# Patient Record
Sex: MALE | URBAN - NONMETROPOLITAN AREA
[De-identification: names, ages, dates, MRNs, and addresses within clinical notes are randomized per-mention and may not be internally consistent; named-entity substitution may affect disease eponyms.]

---

## 2021-12-13 ENCOUNTER — OFFICE VISIT (OUTPATIENT)
Dept: URBAN - NONMETROPOLITAN AREA CLINIC 7 | Facility: CLINIC | Age: 86
End: 2021-12-13
Payer: COMMERCIAL

## 2021-12-13 DIAGNOSIS — H25.813 COMBINED FORMS OF AGE-RELATED CATARACT, BILATERAL: Primary | ICD-10-CM

## 2021-12-13 DIAGNOSIS — H35.3211 EXUDATIVE MACULAR DEGENERATION, WITH ACTIVE CHOROIDAL NEOVASCULARIZATION, RIGHT EYE: ICD-10-CM

## 2021-12-13 DIAGNOSIS — H04.123 TEAR FILM INSUFFICIENCY OF BILATERAL LACRIMAL GLANDS: ICD-10-CM

## 2021-12-13 DIAGNOSIS — H43.813 BILATERAL VITREOUS DETACHMENT OF EYES: ICD-10-CM

## 2021-12-13 DIAGNOSIS — H52.4 PRESBYOPIA: ICD-10-CM

## 2021-12-13 DIAGNOSIS — H11.153 PINGUECULA, BILATERAL: ICD-10-CM

## 2021-12-13 DIAGNOSIS — H40.053 OCULAR HYPERTENSION, BILATERAL: ICD-10-CM

## 2021-12-13 PROCEDURE — 76514 ECHO EXAM OF EYE THICKNESS: CPT | Performed by: OPTOMETRIST

## 2021-12-13 PROCEDURE — 99204 OFFICE O/P NEW MOD 45 MIN: CPT | Performed by: OPTOMETRIST

## 2021-12-13 PROCEDURE — 92134 CPTRZ OPH DX IMG PST SGM RTA: CPT | Performed by: OPTOMETRIST

## 2021-12-13 ASSESSMENT — KERATOMETRY
OS: 44.54
OD: 44.72

## 2021-12-13 ASSESSMENT — INTRAOCULAR PRESSURE
OS: 24
OD: 22

## 2021-12-13 ASSESSMENT — VISUAL ACUITY
OS: 20/40
OD: 20/60

## 2021-12-13 NOTE — IMPRESSION/PLAN
Impression: Nonexudative macular degeneration, early dry stage, left eye: H35.3121. Plan: I recommended use of AREDS-based vitamins daily. Patient education provided. The patient was counseled to contact office if changes to vision occur. I will monitor for future change. OCT ordered for future comparison for signs of progression.

## 2021-12-13 NOTE — IMPRESSION/PLAN
Impression: Ocular hypertension, bilateral: H40.053. Plan: IOP's are elevated OU. The patient will return for a baseline Roman visual field, optical coherence tomography, and an intra-ocular pressure check. Patient education provided.

## 2021-12-13 NOTE — IMPRESSION/PLAN
Impression: Bilateral vitreous detachment of eyes: H43.813. Plan: Education provided regarding symptoms and risks of retinal holes, tears, and detachment. The patient was instructed to contact office immediately should symptoms occur. Continue to monitor.

## 2021-12-13 NOTE — IMPRESSION/PLAN
Impression: Nonexudative macular degeneration, intermediate dry stage, bilateral: H35.3132. Plan: I recommended use of AREDS-based vitamins daily. Patient education provided. The patient was counseled to contact office if changes to vision occur. I will monitor for future change. OCT ordered for future comparison for signs of progression.

## 2021-12-13 NOTE — IMPRESSION/PLAN
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 Plan: Refer to retina. Patient education. OCT ordered and interpreted.

## 2021-12-13 NOTE — IMPRESSION/PLAN
Impression: Combined forms of age-related cataract, bilateral: H25.813. Plan: The cataract(s) account for patient's vision. The options of surgery or glasses change were discussed. Surgery risks and benefits were explained. Patient elects to defer surgery at this time. Continue to monitor.

## 2022-03-21 ENCOUNTER — TESTING ONLY (OUTPATIENT)
Dept: URBAN - NONMETROPOLITAN AREA CLINIC 7 | Facility: CLINIC | Age: 87
End: 2022-03-21
Payer: COMMERCIAL

## 2022-03-21 PROCEDURE — 92133 CPTRZD OPH DX IMG PST SGM ON: CPT | Performed by: OPTOMETRIST

## 2022-03-21 PROCEDURE — 92083 EXTENDED VISUAL FIELD XM: CPT | Performed by: OPTOMETRIST

## 2022-03-22 ENCOUNTER — OFFICE VISIT (OUTPATIENT)
Dept: URBAN - NONMETROPOLITAN AREA CLINIC 7 | Facility: CLINIC | Age: 87
End: 2022-03-22
Payer: COMMERCIAL

## 2022-03-22 DIAGNOSIS — H35.3121 NONEXUDATIVE MACULAR DEGENERATION, EARLY DRY STAGE, LEFT EYE: ICD-10-CM

## 2022-03-22 PROCEDURE — 99213 OFFICE O/P EST LOW 20 MIN: CPT | Performed by: OPTOMETRIST

## 2022-03-22 ASSESSMENT — INTRAOCULAR PRESSURE
OD: 18
OS: 21

## 2022-03-22 NOTE — IMPRESSION/PLAN
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 Plan: Keep appt. w/ Dr. Daryle Knight.

## 2022-03-22 NOTE — IMPRESSION/PLAN
Impression: Ocular hypertension, bilateral: H40.053. *latanoprost Rx'd by Dr. Barrett Shelter 3/15/22 Plan: IOP's lower today. CPM.  Monitor. Testing from 3/21/22 reviewed.

## 2022-06-22 ENCOUNTER — OFFICE VISIT (OUTPATIENT)
Dept: URBAN - NONMETROPOLITAN AREA CLINIC 7 | Facility: CLINIC | Age: 87
End: 2022-06-22
Payer: COMMERCIAL

## 2022-06-22 DIAGNOSIS — H40.053 OCULAR HYPERTENSION, BILATERAL: Primary | ICD-10-CM

## 2022-06-22 DIAGNOSIS — H35.3121 NONEXUDATIVE MACULAR DEGENERATION, EARLY DRY STAGE, LEFT EYE: ICD-10-CM

## 2022-06-22 DIAGNOSIS — H04.123 TEAR FILM INSUFFICIENCY OF BILATERAL LACRIMAL GLANDS: ICD-10-CM

## 2022-06-22 PROCEDURE — 92020 GONIOSCOPY: CPT | Performed by: OPTOMETRIST

## 2022-06-22 PROCEDURE — 99213 OFFICE O/P EST LOW 20 MIN: CPT | Performed by: OPTOMETRIST

## 2022-06-22 PROCEDURE — 92134 CPTRZ OPH DX IMG PST SGM RTA: CPT | Performed by: OPTOMETRIST

## 2022-06-22 ASSESSMENT — INTRAOCULAR PRESSURE
OD: 19
OS: 18

## 2022-06-22 NOTE — IMPRESSION/PLAN
Impression: Nonexudative macular degeneration, early dry stage, left eye: H35.3121. Plan: Continue to monitor.

## 2022-06-22 NOTE — IMPRESSION/PLAN
Impression: Tear film insufficiency of bilateral lacrimal glands: H04.123. Plan: The condition appears to be stable. Continue to monitor.

## 2022-06-22 NOTE — IMPRESSION/PLAN
Impression: Ocular hypertension, bilateral: H40.053. *latanoprost Rx'd by Dr. Marin Sour 3/15/22 Plan: Open on gonio. IOP's stable. CPM.  Monitor.

## 2022-09-21 ENCOUNTER — OFFICE VISIT (OUTPATIENT)
Dept: URBAN - NONMETROPOLITAN AREA CLINIC 7 | Facility: CLINIC | Age: 87
End: 2022-09-21
Payer: COMMERCIAL

## 2022-09-21 DIAGNOSIS — H40.053 OCULAR HYPERTENSION, BILATERAL: Primary | ICD-10-CM

## 2022-09-21 DIAGNOSIS — H04.123 TEAR FILM INSUFFICIENCY OF BILATERAL LACRIMAL GLANDS: ICD-10-CM

## 2022-09-21 PROCEDURE — 99213 OFFICE O/P EST LOW 20 MIN: CPT | Performed by: OPTOMETRIST

## 2022-09-21 RX ORDER — LATANOPROST 50 UG/ML
0.005 % SOLUTION OPHTHALMIC
Qty: 2.5 | Refills: 10 | Status: ACTIVE
Start: 2022-09-21

## 2022-09-21 ASSESSMENT — INTRAOCULAR PRESSURE
OD: 19
OS: 19

## 2022-09-21 NOTE — IMPRESSION/PLAN
Impression: Ocular hypertension, bilateral: H40.053. *latanoprost Rx'd by Dr. Ted Sarkar 3/15/22 Plan: IOP's stable. Continue to monitor. Pt ed.  CPM.

## 2022-12-21 ENCOUNTER — OFFICE VISIT (OUTPATIENT)
Dept: URBAN - NONMETROPOLITAN AREA CLINIC 7 | Facility: CLINIC | Age: 87
End: 2022-12-21
Payer: COMMERCIAL

## 2022-12-21 DIAGNOSIS — H35.3121 NONEXUDATIVE MACULAR DEGENERATION, EARLY DRY STAGE, LEFT EYE: ICD-10-CM

## 2022-12-21 DIAGNOSIS — H04.123 TEAR FILM INSUFFICIENCY OF BILATERAL LACRIMAL GLANDS: ICD-10-CM

## 2022-12-21 DIAGNOSIS — H25.13 AGE-RELATED NUCLEAR CATARACT, BILATERAL: ICD-10-CM

## 2022-12-21 DIAGNOSIS — H43.813 VITREOUS DEGENERATION, BILATERAL: ICD-10-CM

## 2022-12-21 DIAGNOSIS — H40.053 OCULAR HYPERTENSION, BILATERAL: Primary | ICD-10-CM

## 2022-12-21 PROCEDURE — 92250 FUNDUS PHOTOGRAPHY W/I&R: CPT | Performed by: OPTOMETRIST

## 2022-12-21 PROCEDURE — 92014 COMPRE OPH EXAM EST PT 1/>: CPT | Performed by: OPTOMETRIST

## 2022-12-21 RX ORDER — LATANOPROST 50 UG/ML
0.005 % SOLUTION OPHTHALMIC
Qty: 2.5 | Refills: 10 | Status: ACTIVE
Start: 2022-12-21

## 2022-12-21 ASSESSMENT — VISUAL ACUITY
OD: 20/50
OS: 20/40

## 2022-12-21 ASSESSMENT — INTRAOCULAR PRESSURE
OS: 20
OD: 19

## 2022-12-21 NOTE — IMPRESSION/PLAN
Impression: Nonexudative macular degeneration, early dry stage, left eye: H35.0802. Plan: Continue to monitor. Pt ed.

## 2022-12-21 NOTE — IMPRESSION/PLAN
Impression: Ocular hypertension, bilateral: H40.053. *latanoprost Rx'd by Dr. Lukasz Wu 3/15/22 Plan: The IOP's and C/D ratios appear relatively stable today. Continue present medication(s). Return to clinic for an annual Roman visual field, optical coherence tomography and an intra-ocular pressure check. Patient education provided. Photos ordered for future comparison for signs of progression.

## 2023-03-30 ENCOUNTER — OFFICE VISIT (OUTPATIENT)
Dept: URBAN - NONMETROPOLITAN AREA CLINIC 7 | Facility: CLINIC | Age: 88
End: 2023-03-30
Payer: COMMERCIAL

## 2023-03-30 DIAGNOSIS — H40.053 OCULAR HYPERTENSION, BILATERAL: Primary | ICD-10-CM

## 2023-03-30 PROCEDURE — 92083 EXTENDED VISUAL FIELD XM: CPT | Performed by: OPTOMETRIST

## 2023-03-30 PROCEDURE — 99213 OFFICE O/P EST LOW 20 MIN: CPT | Performed by: OPTOMETRIST

## 2023-03-30 PROCEDURE — 92133 CPTRZD OPH DX IMG PST SGM ON: CPT | Performed by: OPTOMETRIST

## 2023-03-30 RX ORDER — LATANOPROST 50 UG/ML
0.005 % SOLUTION OPHTHALMIC
Qty: 2.5 | Refills: 10 | Status: ACTIVE
Start: 2023-03-30

## 2023-03-30 ASSESSMENT — INTRAOCULAR PRESSURE
OD: 18
OS: 19

## 2023-03-30 NOTE — IMPRESSION/PLAN
Impression: Ocular hypertension, bilateral: H40.053. Plan: OCT's slightly worse, VF's are stable. Continue to monitor. Pt ed.  CPM.

## 2023-07-28 ENCOUNTER — OFFICE VISIT (OUTPATIENT)
Dept: URBAN - NONMETROPOLITAN AREA CLINIC 7 | Facility: CLINIC | Age: 88
End: 2023-07-28
Payer: COMMERCIAL

## 2023-07-28 DIAGNOSIS — H40.053 OCULAR HYPERTENSION, BILATERAL: Primary | ICD-10-CM

## 2023-07-28 DIAGNOSIS — H04.123 TEAR FILM INSUFFICIENCY OF BILATERAL LACRIMAL GLANDS: ICD-10-CM

## 2023-07-28 PROCEDURE — 92134 CPTRZ OPH DX IMG PST SGM RTA: CPT | Performed by: OPTOMETRIST

## 2023-07-28 PROCEDURE — 99213 OFFICE O/P EST LOW 20 MIN: CPT | Performed by: OPTOMETRIST

## 2023-07-28 ASSESSMENT — INTRAOCULAR PRESSURE
OS: 20
OD: 18

## 2023-12-08 ENCOUNTER — OFFICE VISIT (OUTPATIENT)
Dept: URBAN - NONMETROPOLITAN AREA CLINIC 7 | Facility: CLINIC | Age: 88
End: 2023-12-08
Payer: COMMERCIAL

## 2023-12-08 DIAGNOSIS — H43.813 VITREOUS DEGENERATION, BILATERAL: ICD-10-CM

## 2023-12-08 DIAGNOSIS — H35.3211 EXUDATIVE MACULAR DEGENERATION, WITH ACTIVE CHOROIDAL NEOVASCULARIZATION, RIGHT EYE: ICD-10-CM

## 2023-12-08 DIAGNOSIS — H25.813 COMBINED FORMS OF AGE-RELATED CATARACT, BILATERAL: ICD-10-CM

## 2023-12-08 DIAGNOSIS — H40.013 OPEN ANGLE WITH BORDERLINE FINDINGS, LOW RISK, BILATERAL: Primary | ICD-10-CM

## 2023-12-08 DIAGNOSIS — H04.123 TEAR FILM INSUFFICIENCY OF BILATERAL LACRIMAL GLANDS: ICD-10-CM

## 2023-12-08 PROCEDURE — 92250 FUNDUS PHOTOGRAPHY W/I&R: CPT | Performed by: OPTOMETRIST

## 2023-12-08 PROCEDURE — 92014 COMPRE OPH EXAM EST PT 1/>: CPT | Performed by: OPTOMETRIST

## 2023-12-08 ASSESSMENT — INTRAOCULAR PRESSURE
OS: 20
OD: 19

## 2024-10-02 ENCOUNTER — OFFICE VISIT (OUTPATIENT)
Dept: URBAN - NONMETROPOLITAN AREA CLINIC 7 | Facility: CLINIC | Age: 89
End: 2024-10-02
Payer: COMMERCIAL

## 2024-10-02 DIAGNOSIS — H04.123 TEAR FILM INSUFFICIENCY OF BILATERAL LACRIMAL GLANDS: ICD-10-CM

## 2024-10-02 DIAGNOSIS — H40.013 OPEN ANGLE WITH BORDERLINE FINDINGS, LOW RISK, BILATERAL: Primary | ICD-10-CM

## 2024-10-02 DIAGNOSIS — H35.3211 EXUDATIVE MACULAR DEGENERATION, WITH ACTIVE CHOROIDAL NEOVASCULARIZATION, RIGHT EYE: ICD-10-CM

## 2024-10-02 DIAGNOSIS — H43.813 VITREOUS DEGENERATION, BILATERAL: ICD-10-CM

## 2024-10-02 DIAGNOSIS — H35.3121 NONEXUDATIVE MACULAR DEGENERATION, EARLY DRY STAGE, LEFT EYE: ICD-10-CM

## 2024-10-02 DIAGNOSIS — H25.813 COMBINED FORMS OF AGE-RELATED CATARACT, BILATERAL: ICD-10-CM

## 2024-10-02 PROCEDURE — 92014 COMPRE OPH EXAM EST PT 1/>: CPT | Performed by: OPTOMETRIST

## 2024-10-02 PROCEDURE — 92250 FUNDUS PHOTOGRAPHY W/I&R: CPT | Performed by: OPTOMETRIST

## 2024-10-02 RX ORDER — LATANOPROST 50 UG/ML
0.005 % SOLUTION OPHTHALMIC
Qty: 2.5 | Refills: 10 | Status: ACTIVE
Start: 2024-10-02

## 2024-10-02 ASSESSMENT — VISUAL ACUITY
OS: 20/60
OD: CF

## 2024-10-02 ASSESSMENT — INTRAOCULAR PRESSURE
OD: 24
OS: 25

## 2025-01-28 ENCOUNTER — OFFICE VISIT (OUTPATIENT)
Dept: URBAN - NONMETROPOLITAN AREA CLINIC 7 | Facility: CLINIC | Age: OVER 89
End: 2025-01-28
Payer: COMMERCIAL

## 2025-01-28 DIAGNOSIS — H04.123 TEAR FILM INSUFFICIENCY OF BILATERAL LACRIMAL GLANDS: ICD-10-CM

## 2025-01-28 DIAGNOSIS — H40.013 OPEN ANGLE WITH BORDERLINE FINDINGS, LOW RISK, BILATERAL: Primary | ICD-10-CM

## 2025-01-28 PROCEDURE — 92083 EXTENDED VISUAL FIELD XM: CPT | Performed by: OPTOMETRIST

## 2025-01-28 PROCEDURE — 99213 OFFICE O/P EST LOW 20 MIN: CPT | Performed by: OPTOMETRIST

## 2025-01-28 PROCEDURE — 92133 CPTRZD OPH DX IMG PST SGM ON: CPT | Performed by: OPTOMETRIST

## 2025-01-28 ASSESSMENT — INTRAOCULAR PRESSURE
OD: 20
OS: 20

## 2025-04-28 ENCOUNTER — OFFICE VISIT (OUTPATIENT)
Dept: URBAN - NONMETROPOLITAN AREA CLINIC 7 | Facility: CLINIC | Age: OVER 89
End: 2025-04-28
Payer: COMMERCIAL

## 2025-04-28 DIAGNOSIS — H04.123 TEAR FILM INSUFFICIENCY OF BILATERAL LACRIMAL GLANDS: ICD-10-CM

## 2025-04-28 DIAGNOSIS — H35.3121 NONEXUDATIVE MACULAR DEGENERATION, EARLY DRY STAGE, LEFT EYE: ICD-10-CM

## 2025-04-28 DIAGNOSIS — H35.3211 EXUDATIVE AGE-RELATED MACULAR DEGENERATION, RIGHT EYE, WITH ACTIVE CHOROIDAL NEOVASCULARIZATION: ICD-10-CM

## 2025-04-28 DIAGNOSIS — H25.813 COMBINED FORMS OF AGE-RELATED CATARACT, BILATERAL: ICD-10-CM

## 2025-04-28 DIAGNOSIS — H40.013 OPEN ANGLE WITH BORDERLINE FINDINGS, LOW RISK, BILATERAL: Primary | ICD-10-CM

## 2025-04-28 PROCEDURE — 99213 OFFICE O/P EST LOW 20 MIN: CPT | Performed by: OPTOMETRIST

## 2025-04-28 PROCEDURE — 92134 CPTRZ OPH DX IMG PST SGM RTA: CPT | Performed by: OPTOMETRIST

## 2025-04-28 ASSESSMENT — INTRAOCULAR PRESSURE
OD: 19
OS: 20